# Patient Record
Sex: FEMALE | Race: BLACK OR AFRICAN AMERICAN
[De-identification: names, ages, dates, MRNs, and addresses within clinical notes are randomized per-mention and may not be internally consistent; named-entity substitution may affect disease eponyms.]

---

## 2021-10-14 ENCOUNTER — HOSPITAL ENCOUNTER (INPATIENT)
Dept: HOSPITAL 96 - M.ERS | Age: 61
LOS: 4 days | Discharge: HOME | DRG: 291 | End: 2021-10-18
Attending: INTERNAL MEDICINE | Admitting: INTERNAL MEDICINE
Payer: COMMERCIAL

## 2021-10-14 VITALS — WEIGHT: 138 LBS | BODY MASS INDEX: 20.92 KG/M2 | HEIGHT: 67.99 IN

## 2021-10-14 VITALS — SYSTOLIC BLOOD PRESSURE: 197 MMHG | DIASTOLIC BLOOD PRESSURE: 113 MMHG

## 2021-10-14 VITALS — SYSTOLIC BLOOD PRESSURE: 157 MMHG | DIASTOLIC BLOOD PRESSURE: 79 MMHG

## 2021-10-14 VITALS — DIASTOLIC BLOOD PRESSURE: 115 MMHG | SYSTOLIC BLOOD PRESSURE: 192 MMHG

## 2021-10-14 VITALS — DIASTOLIC BLOOD PRESSURE: 131 MMHG | SYSTOLIC BLOOD PRESSURE: 213 MMHG

## 2021-10-14 DIAGNOSIS — I50.21: ICD-10-CM

## 2021-10-14 DIAGNOSIS — I13.0: Primary | ICD-10-CM

## 2021-10-14 DIAGNOSIS — I16.0: ICD-10-CM

## 2021-10-14 DIAGNOSIS — N18.9: ICD-10-CM

## 2021-10-14 DIAGNOSIS — C64.9: ICD-10-CM

## 2021-10-14 DIAGNOSIS — R74.01: ICD-10-CM

## 2021-10-14 DIAGNOSIS — Z20.822: ICD-10-CM

## 2021-10-14 DIAGNOSIS — I47.1: ICD-10-CM

## 2021-10-14 DIAGNOSIS — J40: ICD-10-CM

## 2021-10-14 DIAGNOSIS — I42.9: ICD-10-CM

## 2021-10-14 DIAGNOSIS — N17.0: ICD-10-CM

## 2021-10-14 LAB
ABSOLUTE BASOPHILS: 0 THOU/UL (ref 0–0.2)
ABSOLUTE EOSINOPHILS: 0 THOU/UL (ref 0–0.7)
ABSOLUTE MONOCYTES: 0.3 THOU/UL (ref 0–1.2)
ALBUMIN SERPL-MCNC: 3.4 G/DL (ref 3.4–5)
ALP SERPL-CCNC: 344 U/L (ref 46–116)
ALT SERPL-CCNC: 113 U/L (ref 30–65)
ANION GAP SERPL CALC-SCNC: 11 MMOL/L (ref 7–16)
AST SERPL-CCNC: 80 U/L (ref 15–37)
BASOPHILS NFR BLD AUTO: 0.5 %
BILIRUB SERPL-MCNC: 0.7 MG/DL
BUN SERPL-MCNC: 30 MG/DL (ref 7–18)
CALCIUM SERPL-MCNC: 8.9 MG/DL (ref 8.5–10.1)
CHLORIDE SERPL-SCNC: 101 MMOL/L (ref 98–107)
CO2 SERPL-SCNC: 24 MMOL/L (ref 21–32)
CREAT SERPL-MCNC: 1.7 MG/DL (ref 0.6–1.3)
EOSINOPHIL NFR BLD: 0 %
GLUCOSE SERPL-MCNC: 108 MG/DL (ref 70–99)
GRANULOCYTES NFR BLD MANUAL: 77.6 %
HCT VFR BLD CALC: 43.2 % (ref 37–47)
HGB BLD-MCNC: 13.6 GM/DL (ref 12–15)
LYMPHOCYTES # BLD: 1.4 THOU/UL (ref 0.8–5.3)
LYMPHOCYTES NFR BLD AUTO: 18.3 %
MCH RBC QN AUTO: 26.8 PG (ref 26–34)
MCHC RBC AUTO-ENTMCNC: 31.5 G/DL (ref 28–37)
MCV RBC: 85 FL (ref 80–100)
MONOCYTES NFR BLD: 3.6 %
MPV: 8.4 FL. (ref 7.2–11.1)
NEUTROPHILS # BLD: 5.8 THOU/UL (ref 1.6–8.1)
NUCLEATED RBCS: 0 /100WBC
PLATELET COUNT*: 344 THOU/UL (ref 150–400)
POTASSIUM SERPL-SCNC: 4.1 MMOL/L (ref 3.5–5.1)
PROT SERPL-MCNC: 8.3 G/DL (ref 6.4–8.2)
RBC # BLD AUTO: 5.08 MIL/UL (ref 4.2–5)
RDW-CV: 16.1 % (ref 10.5–14.5)
SODIUM SERPL-SCNC: 136 MMOL/L (ref 136–145)
WBC # BLD AUTO: 7.5 THOU/UL (ref 4–11)

## 2021-10-14 NOTE — [place]KG
[place] [address]
Phone:  [phone]                     ELECTROCARDIOGRAM REPORT      
_______________________________________________________________________________
 
[facility] 
M.R.#:    [id number]     Account #:     [id number]  
Admission:    10/14/21    Attend Phys:                     
Discharge:                Date of Birth: [birth year]  
Date of Service: 10/14/21 1255  Report #:      [id number]
        [id number]
_______________________________________________________________________________
THIS REPORT FOR:  //name//                      
 
                         [facility] ED
                                       
Test Date:    2021-10-14               Test Time:    12:55:53
Pat Name:     LLOYD EL         Department:   
Patient ID:   [id number]            Room:          
Gender:       F                        Technician:   DSL
:          1960               Requested By: Gideon Davila
Order Number: [id number] MD:   Yasmani Ram
                                 Measurements
Intervals                              Axis          
Rate:         114                      P:            57
WV:           174                      QRS:          -8
QRSD:         90                       T:            22
QT:           355                                    
QTc:          489                                    
                           Interpretive Statements
Sinus tachycardia
Probable left atrial enlargement
Probable left ventricular hypertrophy
Anterior Q waves, possibly due to LVH
Baseline wander in lead(s) III,aVL
No previous ECG available for comparison
Electronically Signed On 10- 14:31:56 CDT by Yasmani Ram
[email or web address]
 
 
 
 
 
 
 
 
 
 
 
 
 
 
 
 
 
 
  <ELECTRONICALLY SIGNED>
                                           By: Yasmani Ram MD, FACC      
  10/14/21     1431
D: 10/14/21 1255   _____________________________________
T: 10/14/21 1255   Yasmani Ram MD, [facility]        /EPI

## 2021-10-15 VITALS — DIASTOLIC BLOOD PRESSURE: 104 MMHG | SYSTOLIC BLOOD PRESSURE: 186 MMHG

## 2021-10-15 VITALS — SYSTOLIC BLOOD PRESSURE: 167 MMHG | DIASTOLIC BLOOD PRESSURE: 96 MMHG

## 2021-10-15 VITALS — DIASTOLIC BLOOD PRESSURE: 78 MMHG | SYSTOLIC BLOOD PRESSURE: 152 MMHG

## 2021-10-15 VITALS — SYSTOLIC BLOOD PRESSURE: 180 MMHG | DIASTOLIC BLOOD PRESSURE: 96 MMHG

## 2021-10-15 VITALS — DIASTOLIC BLOOD PRESSURE: 83 MMHG | SYSTOLIC BLOOD PRESSURE: 161 MMHG

## 2021-10-15 VITALS — DIASTOLIC BLOOD PRESSURE: 97 MMHG | SYSTOLIC BLOOD PRESSURE: 162 MMHG

## 2021-10-15 LAB
ABSOLUTE BASOPHILS: 0.1 THOU/UL (ref 0–0.2)
ABSOLUTE EOSINOPHILS: 0.2 THOU/UL (ref 0–0.7)
ABSOLUTE MONOCYTES: 0.9 THOU/UL (ref 0–1.2)
ALBUMIN SERPL-MCNC: 3.1 G/DL (ref 3.4–5)
ALP SERPL-CCNC: 274 U/L (ref 46–116)
ALT SERPL-CCNC: 91 U/L (ref 30–65)
ANION GAP SERPL CALC-SCNC: 10 MMOL/L (ref 7–16)
AST SERPL-CCNC: 59 U/L (ref 15–37)
BASOPHILS NFR BLD AUTO: 0.6 %
BILIRUB SERPL-MCNC: 0.7 MG/DL
BUN SERPL-MCNC: 31 MG/DL (ref 7–18)
CALCIUM SERPL-MCNC: 8.7 MG/DL (ref 8.5–10.1)
CHLORIDE SERPL-SCNC: 102 MMOL/L (ref 98–107)
CO2 SERPL-SCNC: 25 MMOL/L (ref 21–32)
CREAT SERPL-MCNC: 1.8 MG/DL (ref 0.6–1.3)
EOSINOPHIL NFR BLD: 1.9 %
GLUCOSE SERPL-MCNC: 91 MG/DL (ref 70–99)
GRANULOCYTES NFR BLD MANUAL: 73.6 %
HCT VFR BLD CALC: 39.8 % (ref 37–47)
HGB BLD-MCNC: 12.7 GM/DL (ref 12–15)
LYMPHOCYTES # BLD: 1.2 THOU/UL (ref 0.8–5.3)
LYMPHOCYTES NFR BLD AUTO: 14 %
MCH RBC QN AUTO: 26.7 PG (ref 26–34)
MCHC RBC AUTO-ENTMCNC: 31.8 G/DL (ref 28–37)
MCV RBC: 84.1 FL (ref 80–100)
MONOCYTES NFR BLD: 9.9 %
MPV: 8.5 FL. (ref 7.2–11.1)
NEUTROPHILS # BLD: 6.5 THOU/UL (ref 1.6–8.1)
NUCLEATED RBCS: 0 /100WBC
PLATELET COUNT*: 314 THOU/UL (ref 150–400)
POTASSIUM SERPL-SCNC: 3.6 MMOL/L (ref 3.5–5.1)
PROT SERPL-MCNC: 7.4 G/DL (ref 6.4–8.2)
RBC # BLD AUTO: 4.73 MIL/UL (ref 4.2–5)
RDW-CV: 15.9 % (ref 10.5–14.5)
SODIUM SERPL-SCNC: 137 MMOL/L (ref 136–145)
WBC # BLD AUTO: 8.8 THOU/UL (ref 4–11)

## 2021-10-15 NOTE — NUR
CM ASSESSMENT:
PT A&O, INDEPENDENT WITH ADL'S, AND DRIVES. PT RESIDES AT HOME WITH SPOUSE. PT
USES 0 DME. PT HAS 0 HX OF HH OR SNF. CM WILL REMAIN AVAILABLE TO ASSIST AND
FOLLOW AS NEEDED.

## 2021-10-15 NOTE — NUR
ASSUMED CARE OF PT THIS AM AROUND 0715- CARDIAC MONITOR IN PLACE AS ORDERED,
TRACING SR- UPON ASSESSMENT PT NOTED TO BE RESTING IN BED, EYES CLOSED- PT A&O
X4-CONT OF B/B- UP AD-CELY IN ROOM, STEADY GAIT NOTED- VSS, O2 % ON RA-
LCTA/DIMINSHED IN BASES- ABD SOFT/FLAT/NON-TENDER, BS X4 QUADS- LAST BM
REPORTED 10/14/21- IV NOTED TO RIGHT FA INTACT AND SL- PT DENIES ANY C/O PAIN-
CALL LIGHT AND PERSONAL BELONGINGS WITH IN REACH- HOURLY ROUNDS IN PLACE R/T
SAFETY/NEEDS- ALL NEEDS MET AT THIS TIME

## 2021-10-15 NOTE — NUR
Nutrition: Pt admitted with CHF exac. Consult for 2# wt loss. Pt in ISOLATION.
H/o CHF. Wt: 138#. Albumin 3.4, BG ok, BUN 30, cr 1.7. Pt on regular diet. RD
restricted diet to 2gm Na. Pt is on daily wts. Will follow wts, po intake.
Mild risk. F/u 10/20/21.

## 2021-10-15 NOTE — EKG
[place] [address]
Phone:  [phone]                     ELECTROCARDIOGRAM REPORT      
_______________________________________________________________________________
 
[facility]         [facility]          [address]    ADM IN 
M.R.#:    [id number]     Account #:     [id number]  
Admission:    10/14/21    Attend Phys:   Franklin Holley
Discharge:                Date of Birth: [birth year]  
Date of Service: 10/15/21 1011  Report #:      [id number]
        [id number]
_______________________________________________________________________________
THIS REPORT FOR:  //name//                      
 
                          [facility]
                                       
Test Date:    2021-10-15               Test Time:    10:11:20
Pat Name:     LLOYD EL         Department:   
Patient ID:   [id number]            Room:         [address]
Gender:       F                        Technician:   ROMMEL
:          1960               Requested By: Jolene Valderrama
Order Number: [id number]    Reading MD:   Yasmani Ram
                                 Measurements
Intervals                              Axis          
Rate:         99                       P:            73
IN:           191                      QRS:          -30
QRSD:         83                       T:            
QT:           314                                    
QTc:          403                                    
                           Interpretive Statements
Sinus rhythm
Left atrial enlargement
Left ventricular hypertrophy
Anterior Q waves, possibly due to LVH
Borderline T abnormalities, inferior leads
Compared to ECG 10/14/2021 12:55:53
T-wave abnormality now present
Sinus tachycardia no longer present
Electronically Signed On 10- 17:04:04 CDT by Yasmani Ram
[email or web address]
 
 
 
 
 
 
 
 
 
 
 
 
 
 
 
 
  <ELECTRONICALLY SIGNED>
                                           By: Yasmani Ram MD, FACC      
  10/15/21     1704
D: 10/15/21 101   _____________________________________
T: 10/15/21 1011   Yasmani Ram MD, FAC        /EPI

## 2021-10-15 NOTE — 2DMMODE
[place] [address]
Phone:  [phone] 2 D/M-MODE ECHOCARDIOGRAM     
_______________________________________________________________________________
 
Name:         LLOYD ELRoom:          M.218-P    ADM IN 
M.R.#:    [id number]     Account #:     [id number]  
Admission:    10/14/21    Attend Phys:   Franklin Holley
Discharge:                Date of Birth: [birth year]  
Date of Service: 10/15/21 1830  Report #:      [id number]
        [id number]
_______________________________________________________________________________
THIS REPORT FOR:
 
cc:  FAM - No family physician/PCP 
     FAM - No family physician/PCP 
     Yasmani Ram MD [facility]        
                                                                       ~
 
--------------- APPROVED REPORT --------------
 
 
Study performed:  10/15/2021 17:49:00
 
EXAM: Comprehensive 2D, Doppler, and color-flow 
Echocardiogram 
Patient Location: In-Patient   
Room #:  218     Status:  routine
 
      BSA:         1.75
HR: 105 bpm BP:          162/97 mmHg 
Rhythm: NSR     
 
Other Information 
Study Quality: Good
 
Indications
Congestive Heart Failure
 
2D Dimensions
IVSd:  12.25 (7-11mm) LVOT Diam:  19.31 (18-24mm) 
LVDd:  51.45 mm  
PWd:  12.01 (7-11mm) Ascending Ao:  25.90 (22-36mm)
LVDs:  40.42 (25-40mm) 
Aortic Root:  27.13 mm 
 
Volumes
Left Atrial Volume (Systole) 
    LA ESV Index:  52.80 mL/m2
 
Aortic Valve
AoV Peak Jose.:  1.14 m/s 
AO Peak Gr.:  5.21 mmHg  LVOT Max P.41 mmHg
AO Mean Gr.:  2.78 mmHg  LVOT Mean P.08 mmHg
    LVOT Max V:  0.78 m/s
AO V2 VTI:  17.58 cm  LVOT Mean V:  0.47 m/s
JIMENEZ (VTI):  1.84 cm2  LVOT V1 VTI:  11.07 cm
 
 
 
[place] [address]
Phone:  [phone]                     2 D/M-MODE ECHOCARDIOGRAM     
_______________________________________________________________________________
 
[facility]         [facility]P    ADM IN 
M.R.#:    [id number]     Account #:     [id number]  
Admission:    10/14/21    Attend Phys:   Franklin Holley
Discharge:                Date of Birth: [birth year]  
Date of Service: 10/15/21 1830  Report #:      [id number]
        [id number]
_______________________________________________________________________________
Mitral Valve
    E/A Ratio:  1.17
    MV Decel. Time:  61.70 ms
MV E Max Jose.:  1.14 m/s 
MV PHT:  17.89 ms  
MVA (PHT):  12.29 cm2  
 
TDI
E/Lateral E':  12.67 
   Lateral E' Jose.:  0.09 m/s
 
Pulmonary Valve
PV Peak Jose.:  0.68 m/s PV Peak Gr.:  1.86 mmHg
 
Tricuspid Valve
    RAP Estimate:  5.00 mmHg
TR Peak Gr.:  22.00 mmHg RVSP:  27.00 mmHg
    PA Pressure:  27.00 mmHg
 
Left Ventricle
Left ventricle is dilated. There is global hypokinesis of the left 
ventricle. Mild concentric left ventricular hypertrophy. Left 
ventricular systolic function is severly decreased. LVEF is 25-30%. 
Grade IV - fixed restrictive diastolic dysfunction.
 
Right Ventricle
Right ventricle is dilated. The right ventricular systolic function 
is normal.
 
Atria
Left atrium is severely dilated. Right atrium is severely 
dilated.
 
Aortic Valve
Mild aortic valve sclerosis. No aortic regurgitation is present. 
There is no aortic valvular stenosis. 
 
Mitral Valve
The mitral valve is normal in structure. Moderate mitral 
regurgitation. No evidence of mitral valve stenosis.
 
Tricuspid Valve
The tricuspid valve is normal in structure. Mild tricuspid 
regurgitation. No pulmonary hypertension.
 
Pulmonic Valve
 
 
[place] [address]
Phone:  [phone] 2 D/M-MODE ECHOCARDIOGRAM     
_______________________________________________________________________________
 
Name[facility]         [facility]          M.218-P    [facility] [place] 
..#:    [id number]     Account #:     [id number]  
Admission:    10/14/21    Attend Phys:   Franklin Holley
Discharge:                Date of Birth: [birth year]  
Date of Service: 10/15/21 1830  Report #:      [id number]
        [id number]
_______________________________________________________________________________
The pulmonary valve is normal in structure. There is no pulmonic 
valvular regurgitation.
 
Great Vessels
The aortic root is normal in size. IVC is normal in size and 
collapses >50% with inspiration.
 
Pericardium
Trace pericardial effusion. Large left pleural 
effusion.
 
<Conclusion>
Left ventricle is dilated.
Mild concentric left ventricular hypertrophy.
LVEF is 25-30%.
Left atrium is severely dilated.
Mild aortic valve sclerosis.
Moderate mitral regurgitation.
 
 
 
 
 
 
 
 
 
 
 
 
 
 
 
 
 
 
 
 
 
 
 
 
 
 
  <ELECTRONICALLY SIGNED>
                                           By: Yasmani Ram MD, FACC      
  10/15/21     1830
D: 10/15/21 1830   _____________________________________
T: 10/15/21 1830   Yasmani Ram MD, FACC        /INF

## 2021-10-16 VITALS — DIASTOLIC BLOOD PRESSURE: 89 MMHG | SYSTOLIC BLOOD PRESSURE: 165 MMHG

## 2021-10-16 VITALS — DIASTOLIC BLOOD PRESSURE: 116 MMHG | SYSTOLIC BLOOD PRESSURE: 194 MMHG

## 2021-10-16 VITALS — SYSTOLIC BLOOD PRESSURE: 140 MMHG | DIASTOLIC BLOOD PRESSURE: 62 MMHG

## 2021-10-16 VITALS — DIASTOLIC BLOOD PRESSURE: 89 MMHG | SYSTOLIC BLOOD PRESSURE: 151 MMHG

## 2021-10-16 VITALS — SYSTOLIC BLOOD PRESSURE: 161 MMHG | DIASTOLIC BLOOD PRESSURE: 91 MMHG

## 2021-10-16 LAB
ALBUMIN SERPL-MCNC: 2.7 G/DL (ref 3.4–5)
ALP SERPL-CCNC: 222 U/L (ref 46–116)
ALT SERPL-CCNC: 79 U/L (ref 30–65)
ANION GAP SERPL CALC-SCNC: 15 MMOL/L (ref 7–16)
AST SERPL-CCNC: 45 U/L (ref 15–37)
BILIRUB SERPL-MCNC: 0.4 MG/DL
BUN SERPL-MCNC: 30 MG/DL (ref 7–18)
CALCIUM SERPL-MCNC: 8.5 MG/DL (ref 8.5–10.1)
CHLORIDE SERPL-SCNC: 105 MMOL/L (ref 98–107)
CHOLEST SERPL-MCNC: 145 MG/DL (ref ?–200)
CO2 SERPL-SCNC: 21 MMOL/L (ref 21–32)
CREAT SERPL-MCNC: 1.7 MG/DL (ref 0.6–1.3)
GLUCOSE SERPL-MCNC: 80 MG/DL (ref 70–99)
HCT VFR BLD CALC: 37.2 % (ref 37–47)
HDLC SERPL-MCNC: 52 MG/DL (ref 40–?)
HGB BLD-MCNC: 11.7 GM/DL (ref 12–15)
LDLC SERPL-MCNC: 85 MG/DL (ref ?–100)
MCH RBC QN AUTO: 27.7 PG (ref 26–34)
MCHC RBC AUTO-ENTMCNC: 31.5 G/DL (ref 28–37)
MCV RBC: 87.9 FL (ref 80–100)
MPV: 8.5 FL. (ref 7.2–11.1)
PLATELET COUNT*: 280 THOU/UL (ref 150–400)
POTASSIUM SERPL-SCNC: 4.3 MMOL/L (ref 3.5–5.1)
PROT SERPL-MCNC: 6 G/DL (ref 6.4–8.2)
RBC # BLD AUTO: 4.23 MIL/UL (ref 4.2–5)
RDW-CV: 16.7 % (ref 10.5–14.5)
SODIUM SERPL-SCNC: 141 MMOL/L (ref 136–145)
TC:HDL: 2.8 RATIO
TRIGL SERPL-MCNC: 44 MG/DL (ref ?–150)
VLDLC SERPL CALC-MCNC: 9 MG/DL (ref ?–40)
WBC # BLD AUTO: 8.8 THOU/UL (ref 4–11)

## 2021-10-16 NOTE — CON
[address]  [address]                    CONSULTATION                  
_______________________________________________________________________________
 
Name:       ELLLOYDASHLY TRIANAELE Room:           [address] [place]  
.R.#:  [id number]      Account #:      [id number]  
Admission:  10/14/21     Attend Phys:    ALBARO Fan
Discharge:               Date of Birth:  [birth year]  
         Report #: [id number]
                                                                     [id number]
_______________________________________________________________________________
THIS REPORT FOR:  
 
cc:  FAM - No family physician/PCP 
     FAM - No family physician/PCP                                        
     Yasmani Ram MD [facility]                                            ~
 
 
DATE OF CONSULTATION: 10/15/2021
 
HISTORY OF PRESENT ILLNESS:  The patient is a 60-year-old  black female 
who I was asked to see in the hospital today after she complained of having a 
cough.  The patient has no previous history of heart disease.  Recently, she has
felt short of breath, weak.  She has been coughing, no appetite.  She did 
receive the COVID vaccine.  Her  finally brought her to the hospital 
yesterday.  She is admitted for further evaluation and treatment.  She has no 
previous history of cardiac test.  She does note recently she has had some 
tightness in her chest, that is usually related to rest.  It is not exertion 
related.  There is no radiation of the pain.  Denies palpitations, syncope.  She
has had no vomiting, diarrhea, blood in her stool.
 
PAST MEDICAL HISTORY:  She has had previous laparoscopy.  No history of 
hypertension, diabetes, hyperlipidemia.
 
MEDICATIONS:  She is not on medication.
 
ALLERGIES:  NO KNOWN DRUG ALLERGIES.
 
FAMILY HISTORY:  Her mother had a rapid heart rate.
 
SOCIAL HISTORY:  She is .  She and her  live in [place].  No 
smoking, alcohol abuse.
 
REVIEW OF SYSTEMS:  No history of stroke, asthma, liver disease, kidney disease,
cancer, psychiatric illness, chronic skin condition.
 
PHYSICAL EXAMINATION:
GENERAL:  Revealed a middle-aged black female who appears in no distress.
VITAL SIGNS:  She had a blood pressure of 150/90, pulse 90.  She is afebrile.
HEENT:  She was anicteric.  Conjunctivae pink.  Mucosa is moist.
NECK:  Veins not distended.  No carotid bruits.  Neck is supple.
CHEST:  Clear to auscultation.
HEART:  Regular rate and rhythm.  No murmur.
ABDOMEN:  Soft.
EXTREMITIES:  Had no edema.
SKIN:  Warm and dry.
NEUROLOGIC:  Nonfocal.
 
 
 
[place] [address]                    CONSULTATION                  
_______________________________________________________________________________
 
Name:       LLOYD EL Room:           [address] [place]  
[facility]#:  [id number]      Account #:      [id number]  
Admission:  10/14/21     Attend Phys:    ALBARO Fan
Discharge:               Date of Birth:  [birth year]  
         Report #: [id number]
                                                                     [id number]
_______________________________________________________________________________
 
 
 
LABORATORY DATA:  Her ECG showed a sinus rhythm with nonspecific T-wave changes.
 Her chest x-ray in the Emergency Room last night showed mild cardiomegaly, mild
interstitial changes.  Her lab work, creatinine 1.8, alkaline phosphatase is 
274.  SGOT 59, bilirubin 0.7.  Her SGPT was 91.  Her high sensitivity troponin 
was 84.  BNP 11,722, hemoglobin 12.7.  Her COVID antigen stat test was negative.
 
IMPRESSION AND RECOMMENDATIONS:
1.  Pulmonary edema.  Recommend Lasix.  Check echocardiogram.
2.  Bronchitis.
3.  Chronic kidney disease.
4.  Elevated liver function studies.
 
 
 
 
 
 
 
 
 
 
 
 
 
 
 
 
 
 
 
 
 
 
 
 
 
 
 
 
 
 
<ELECTRONICALLY SIGNED>
                                        By:  Yasmani Ram MD, FACC      
10/16/21     1003
D: 10/15/21 1406_______________________________________
T: 10/15/21 1845Daguera Ram MD, FACC         /nt

## 2021-10-16 NOTE — NUR
PT RAYMOND RESTING ON SIDE OF BED, VISITING WITH FAMILY AT BED SIDE- CARDIAC
MONITOR NOTED WITH SR- IV TO RIGHT FA INTACT AND SL, IV ABT GIVEN THIS AM AND
THEN D/C'D PER PHYSICIAN- BEPHRO CONSULT NOTED THIS SHIFT R/T KIDNEY MASS-
LASIX 40MG BID ORDERED AND GIVEN THIS SHIFT AS PRESRIBED- LOSARTAN AND COREG
ORDERED THIS AM PER CARDIO AND GIVEN AS PRESCRIBED WITH NOTED IMPROVEMENT IN
BP THIS SHIFT- CHEST X-RAY COMPLETED THIS AM AS ORDERED WITH RESULTS NOTED IN
[facility]- GOOD PO INTAKE NOTED THIS SHIFT WITH MEALS- DENIES ANY C/O PAIN-
CALL LIGHT AND PERSONAL BELONGINGS WITH IN REACH- PT MAKES NEEDS KNOWN- ALL
NEEDS MET AT THIS TIME

## 2021-10-16 NOTE — NUR
PT A&O X 4. ON RA. BP HIGH. MEDS GIVEN AS ORDERED. NO C/O PAIN. NEGATIVE FOR
COVID. UP INDEPENDENTLY IN ROOM. CALL LIGHT WITHIN REACH. WILL CONTINUE TO
MONITOR.

## 2021-10-17 VITALS — DIASTOLIC BLOOD PRESSURE: 101 MMHG | SYSTOLIC BLOOD PRESSURE: 174 MMHG

## 2021-10-17 VITALS — DIASTOLIC BLOOD PRESSURE: 80 MMHG | SYSTOLIC BLOOD PRESSURE: 149 MMHG

## 2021-10-17 VITALS — SYSTOLIC BLOOD PRESSURE: 143 MMHG | DIASTOLIC BLOOD PRESSURE: 63 MMHG

## 2021-10-17 VITALS — DIASTOLIC BLOOD PRESSURE: 92 MMHG | SYSTOLIC BLOOD PRESSURE: 163 MMHG

## 2021-10-17 VITALS — DIASTOLIC BLOOD PRESSURE: 92 MMHG | SYSTOLIC BLOOD PRESSURE: 152 MMHG

## 2021-10-17 VITALS — DIASTOLIC BLOOD PRESSURE: 81 MMHG | SYSTOLIC BLOOD PRESSURE: 140 MMHG

## 2021-10-17 LAB
ANION GAP SERPL CALC-SCNC: 10 MMOL/L (ref 7–16)
BUN SERPL-MCNC: 28 MG/DL (ref 7–18)
CALCIUM SERPL-MCNC: 8.4 MG/DL (ref 8.5–10.1)
CHLORIDE SERPL-SCNC: 106 MMOL/L (ref 98–107)
CO2 SERPL-SCNC: 28 MMOL/L (ref 21–32)
CREAT SERPL-MCNC: 1.7 MG/DL (ref 0.6–1.3)
GLUCOSE SERPL-MCNC: 86 MG/DL (ref 70–99)
MAGNESIUM SERPL-MCNC: 1.9 MG/DL (ref 1.8–2.4)
POTASSIUM SERPL-SCNC: 3.5 MMOL/L (ref 3.5–5.1)
SODIUM SERPL-SCNC: 144 MMOL/L (ref 136–145)

## 2021-10-17 NOTE — CON
[address]  [address]                    CONSULTATION                  
_______________________________________________________________________________
 
Name:       SIENNALLOYDSUZANNE TRIANAELE Room:           [address] [place]  
M.R.#:  [id number]      Account #:      [id number]  
Admission:  10/14/21     Attend Phys:    ALBARO Fan
Discharge:               Date of Birth:  [birth year]  
         Report #: [id number]
                                                                     [id number]
_______________________________________________________________________________
THIS REPORT FOR:  
 
cc:  KENAN - Shasha family physician/PCP 
     KENAN - Shasha family physician/PCP                                        
     Daniela Mendez MD                                                   ~
 
 
DATE OF CONSULTATION: 10/16/2021
 
NEPHROLOGY CONSULTATION
 
CONSULTING PHYSICIAN:  Franklin Holley DO.
 
REASON FOR CONSULTATION:  Renal mass and elevated creatinine.
 
HISTORY OF PRESENT ILLNESS:  A 60-year-old female with no significant past 
medical history who was admitted with shortness of breath and pulmonary edema.  
She was seen by Cardiology, given Lasix and is feeling much better today.  Her 
chest x-ray does not show any evidence of congestive heart failure or pneumonia 
today.  She appears to be comfortable.  During the course of her evaluation, she
had a CT scan of her abdomen and it showed a complex left renal mass concerning 
for malignancy.  She has no known history of kidney problems.  She has not seen 
a doctor in some time.  She is otherwise healthy.  Denies any nausea, vomiting. 
No regular NSAIDs.  She appears to be comfortable at present time.
 
REVIEW OF SYSTEMS:  Constitutional, psych, heme, eyes, ENT, respiratory, 
cardiac, GI, , endocrine, all negative except as documented above.
 
PAST MEDICAL HISTORY:  She has not been seeing a doctor on a regular basis, but 
she appears to have hypertension.  She had a blood pressure of 213/131 when she 
came in.  She has been started on blood pressure medication.
 
FAMILY HISTORY:  Nonpertinent for this 60-year-old female, but she does mention 
that her mother had kidney failure.
 
SOCIAL HISTORY:  No tobacco.
 
CURRENT MEDICATIONS:  Reviewed.
 
PHYSICAL EXAMINATION:
VITAL SIGNS:  Blood pressure is 140/62, pulse 99, respirations 19, temperature 
36.8.
GENERAL:  Showed no acute distress.
HEENT:  Eyes:  Open.  Ears:  Externally normal.
NECK:  Supple.
CARDIOVASCULAR:  Regular rate and rhythm.
 
 
 
[place] [address]                    CONSULTATION                  
_______________________________________________________________________________
 
Name:       LLOYD EL Room:           [address] [place]  
[facility]#:  [id number]      Account #:      [id number]  
Admission:  10/14/21     Attend Phys:    ALBARO Fan
Discharge:               Date of Birth:  [birth year]  
         Report #: [id number]
                                                                     [id number]
_______________________________________________________________________________
 
 
LUNGS:  No crackles.
ABDOMEN:  Soft.
MUSCULOSKELETAL:  Nontender.
PSYCHIATRIC:  Awake, alert.
 
LABORATORY DATA:  White cell count 8.8, hemoglobin 11.7, platelets 280.  Sodium 
141, potassium 4.3, chloride 105, bicarbonate 21, BUN 30, creatinine 1.7, 
glucose 80.  CK was 87, albumin 2.7.
 
ASSESSMENT AND PLAN:
1.  She had a CT scan of her abdomen which showed a complex mass in the left 
kidney, but was otherwise unrevealing with no evidence of hydronephrosis or 
stones.
2.  Hypertension.
3.  Cardiomyopathy with an EF of 25-30%.  Cardiology is consulted.
4.  Complex left renal mass, 12.5 cm in size, concerning for malignancy of the 
left kidney.
 
PLAN:  Renal function is stable.  Cardiology is managing an antihypertensive and
diuretic medication.  She appears to be well compensated at this time.  She will
need outpatient renal followup for possible chronic kidney disease, but this 
appears to be stable at present time.  Regarding the complex appearing left 
renal mass, she needs to see a urologist as soon as possible to have further 
evaluation of this done.  Regarding her elevated creatinine, there is no 
indication for a kidney biopsy from that standpoint, but she will need urgent 
evaluation by urologist for the kidney mass that she has.  She can follow up in 
Renal Clinic in 1 month's time.  No recommendations otherwise from my 
standpoint.  I will sign off the case.  Please call with any questions.
 
 
 
 
 
 
 
 
 
 
 
 
 
 
<ELECTRONICALLY SIGNED>
                                        By:  Daniela Mendez MD              
10/17/21     1323
D: 10/16/21 1233_______________________________________
T: 10/16/21 2018Daniela Mendez MD                 /nt

## 2021-10-17 NOTE — NUR
PT SLEPT MOST OF SHIFT. ASSESSMENT AS DOCUMENTED. MEDS GIVEN PER E-MAR. IV
PATENT. NO REPORTS OF PAIN. PT ABLE TO MAKE NEEDS KNOWN. WILL CONTINUE WITH
PLAN OF CARE.

## 2021-10-17 NOTE — NUR
ASSUMED CARE OF PT THIS AM AROUND 0715- CARDIAC MONITOR IN PLACE AS ORDERED,
TRACING SR- UPON ASSESSMENT PT NOTED TO BE RESTING IN BED- PT A&O X4- CONT OF
B/B- UP AD-CELY IN ROOM, STEADY GAIT NOTED- LCTA, DYSPNEA NOTED ON EXERTION-
VSS, O2 % ON RA- ABD SOFT/ROUND/NON-TENDER, BS X4 QUADS- LAST BM
REPORTED X4 DAYS AGO, PT DENIES NEED FOR LAXATIVE AT THIS TIME- IV NOTED TO
RIGHT FA INTACT AND SL- UROLOGY CONSULT NOTED THIS AM R/T RENAL MASS- PT
DENIES ANY C/O PAIN- CALL LIGHT AND PESONAL BELONGINGS WITH IN REACH- PT MAKES
NEEDS KNOWN- ALL NEEDS MET AT THIS TIME

## 2021-10-18 VITALS — SYSTOLIC BLOOD PRESSURE: 137 MMHG | DIASTOLIC BLOOD PRESSURE: 72 MMHG

## 2021-10-18 VITALS — DIASTOLIC BLOOD PRESSURE: 72 MMHG | SYSTOLIC BLOOD PRESSURE: 137 MMHG

## 2021-10-18 VITALS — DIASTOLIC BLOOD PRESSURE: 67 MMHG | SYSTOLIC BLOOD PRESSURE: 126 MMHG

## 2021-10-18 VITALS — DIASTOLIC BLOOD PRESSURE: 78 MMHG | SYSTOLIC BLOOD PRESSURE: 137 MMHG

## 2021-10-18 NOTE — NUR
PT SLEPT MOST OF SHIFT. ASSESSMENT AS DOCUMENTED. MEDS GIVEN PER E-MAR. IV
PATENT. NO REPORTS OF PAIN. PT INDEPENDENT IN ROOM. WILL CONTINUE WITH PLAN OF
CARE.

## 2021-10-18 NOTE — NUR
ASSUMED PT CARE AT 0730. PT IS A&0X4. PLEASANT AND COOPERATIVE. ASSESSMENT
COMPLETED, PT DENIES ANY DISCOMFORT OR CONCERNS. MEDICATIONS ADMINISTERED AS
ORDERED. NEW ORDERS TO DISCHARGE PT TO HOME.IV DC'D AND HEART MONITOR REMOVED.
ALL BELONGINGS WITH PT.  DISCHARGE ORDERS REVIEWED WITH PT AND EDUCATION GIVEN
WITH COPIES FOR EACH NEW MEDICATION.  PT TRANSPORTED VIA WC BY NURSING STAFF
TO EXIT VIA CAR WITH SPOUSE.

## 2021-11-02 ENCOUNTER — HOSPITAL ENCOUNTER (EMERGENCY)
Dept: HOSPITAL 96 - M.ERS | Age: 61
Discharge: HOME | End: 2021-11-02
Payer: COMMERCIAL

## 2021-11-02 VITALS — SYSTOLIC BLOOD PRESSURE: 159 MMHG | DIASTOLIC BLOOD PRESSURE: 77 MMHG

## 2021-11-02 VITALS — HEIGHT: 68 IN | BODY MASS INDEX: 19.4 KG/M2 | WEIGHT: 128 LBS

## 2021-11-02 DIAGNOSIS — H53.8: ICD-10-CM

## 2021-11-02 DIAGNOSIS — I10: ICD-10-CM

## 2021-11-02 DIAGNOSIS — R42: Primary | ICD-10-CM

## 2021-11-02 DIAGNOSIS — R11.0: ICD-10-CM

## 2021-11-02 DIAGNOSIS — Z79.899: ICD-10-CM

## 2021-11-02 LAB
ABSOLUTE BASOPHILS: 0.1 THOU/UL (ref 0–0.2)
ABSOLUTE EOSINOPHILS: 0.1 THOU/UL (ref 0–0.7)
ABSOLUTE MONOCYTES: 0.6 THOU/UL (ref 0–1.2)
ALBUMIN SERPL-MCNC: 3 G/DL (ref 3.4–5)
ALP SERPL-CCNC: 108 U/L (ref 46–116)
ALT SERPL-CCNC: 15 U/L (ref 30–65)
ANION GAP SERPL CALC-SCNC: 9 MMOL/L (ref 7–16)
AST SERPL-CCNC: 27 U/L (ref 15–37)
BASOPHILS NFR BLD AUTO: 0.9 %
BILIRUB SERPL-MCNC: 0.5 MG/DL
BUN SERPL-MCNC: 36 MG/DL (ref 7–18)
CALCIUM SERPL-MCNC: 9.2 MG/DL (ref 8.5–10.1)
CHLORIDE SERPL-SCNC: 100 MMOL/L (ref 98–107)
CO2 SERPL-SCNC: 27 MMOL/L (ref 21–32)
CREAT SERPL-MCNC: 0.3 MG/DL (ref 0.6–1.3)
EOSINOPHIL NFR BLD: 2.1 %
GLUCOSE SERPL-MCNC: 73 MG/DL (ref 70–99)
GRANULOCYTES NFR BLD MANUAL: 73.4 %
HCT VFR BLD CALC: 42 % (ref 37–47)
HGB BLD-MCNC: 13.1 GM/DL (ref 12–15)
LIPASE: 158 U/L (ref 73–393)
LYMPHOCYTES # BLD: 0.8 THOU/UL (ref 0.8–5.3)
LYMPHOCYTES NFR BLD AUTO: 13.7 %
MCH RBC QN AUTO: 26.3 PG (ref 26–34)
MCHC RBC AUTO-ENTMCNC: 31.2 G/DL (ref 28–37)
MCV RBC: 84.2 FL (ref 80–100)
MONOCYTES NFR BLD: 9.9 %
MPV: 8.6 FL. (ref 7.2–11.1)
NEUTROPHILS # BLD: 4.4 THOU/UL (ref 1.6–8.1)
NUCLEATED RBCS: 0 /100WBC
PLATELET COUNT*: 267 THOU/UL (ref 150–400)
POTASSIUM SERPL-SCNC: 4.6 MMOL/L (ref 3.5–5.1)
PROT SERPL-MCNC: 8.4 G/DL (ref 6.4–8.2)
RBC # BLD AUTO: 4.99 MIL/UL (ref 4.2–5)
RDW-CV: 16.3 % (ref 10.5–14.5)
SODIUM SERPL-SCNC: 136 MMOL/L (ref 136–145)
WBC # BLD AUTO: 5.9 THOU/UL (ref 4–11)